# Patient Record
Sex: MALE | Employment: FULL TIME | URBAN - METROPOLITAN AREA
[De-identification: names, ages, dates, MRNs, and addresses within clinical notes are randomized per-mention and may not be internally consistent; named-entity substitution may affect disease eponyms.]

---

## 2022-07-30 DIAGNOSIS — Z00.00 ENCOUNTER FOR PREVENTIVE HEALTH EXAMINATION: ICD-10-CM

## 2022-08-23 ENCOUNTER — OFFICE VISIT (OUTPATIENT)
Dept: FAMILY MEDICINE CLINIC | Facility: CLINIC | Age: 47
End: 2022-08-23

## 2022-08-23 ENCOUNTER — HOSPITAL ENCOUNTER (OUTPATIENT)
Dept: NON INVASIVE DIAGNOSTICS | Facility: CLINIC | Age: 47
Discharge: HOME | End: 2022-08-23

## 2022-08-23 ENCOUNTER — APPOINTMENT (OUTPATIENT)
Dept: LAB | Facility: CLINIC | Age: 47
End: 2022-08-23

## 2022-08-23 ENCOUNTER — HOSPITAL ENCOUNTER (OUTPATIENT)
Dept: VASCULAR ULTRASOUND | Facility: HOSPITAL | Age: 47
Discharge: HOME | End: 2022-08-23

## 2022-08-23 ENCOUNTER — HOSPITAL ENCOUNTER (OUTPATIENT)
Dept: ULTRASOUND IMAGING | Facility: HOSPITAL | Age: 47
Discharge: HOME | End: 2022-08-23

## 2022-08-23 ENCOUNTER — HOSPITAL ENCOUNTER (OUTPATIENT)
Dept: CT IMAGING | Facility: HOSPITAL | Age: 47
Discharge: HOME | End: 2022-08-23

## 2022-08-23 VITALS
RESPIRATION RATE: 14 BRPM | SYSTOLIC BLOOD PRESSURE: 142 MMHG | DIASTOLIC BLOOD PRESSURE: 90 MMHG | BODY MASS INDEX: 36.56 KG/M2 | HEIGHT: 70 IN | HEART RATE: 82 BPM | WEIGHT: 255.4 LBS

## 2022-08-23 VITALS
SYSTOLIC BLOOD PRESSURE: 113 MMHG | WEIGHT: 255.73 LBS | DIASTOLIC BLOOD PRESSURE: 77 MMHG | BODY MASS INDEX: 36.61 KG/M2 | HEIGHT: 70 IN | HEART RATE: 80 BPM

## 2022-08-23 DIAGNOSIS — Z00.00 ENCOUNTER FOR PREVENTIVE HEALTH EXAMINATION: ICD-10-CM

## 2022-08-23 DIAGNOSIS — J30.1 SEASONAL ALLERGIC RHINITIS DUE TO POLLEN: ICD-10-CM

## 2022-08-23 DIAGNOSIS — E78.2 MIXED HYPERLIPIDEMIA: ICD-10-CM

## 2022-08-23 DIAGNOSIS — E66.09 CLASS 2 OBESITY DUE TO EXCESS CALORIES WITHOUT SERIOUS COMORBIDITY WITH BODY MASS INDEX (BMI) OF 36.0 TO 36.9 IN ADULT: ICD-10-CM

## 2022-08-23 DIAGNOSIS — R31.29 MICROSCOPIC HEMATURIA: ICD-10-CM

## 2022-08-23 DIAGNOSIS — Z00.00 WELL ADULT EXAM: Primary | ICD-10-CM

## 2022-08-23 DIAGNOSIS — N40.1 BENIGN PROSTATIC HYPERPLASIA WITH URINARY FREQUENCY: ICD-10-CM

## 2022-08-23 DIAGNOSIS — K58.0 IRRITABLE BOWEL SYNDROME WITH DIARRHEA: ICD-10-CM

## 2022-08-23 DIAGNOSIS — E55.9 VITAMIN D DEFICIENCY: ICD-10-CM

## 2022-08-23 DIAGNOSIS — R73.09 ABNORMAL BLOOD SUGAR: ICD-10-CM

## 2022-08-23 DIAGNOSIS — R91.1 LUNG NODULE: ICD-10-CM

## 2022-08-23 DIAGNOSIS — R35.0 BENIGN PROSTATIC HYPERPLASIA WITH URINARY FREQUENCY: ICD-10-CM

## 2022-08-23 PROBLEM — E66.812 CLASS 2 OBESITY DUE TO EXCESS CALORIES WITHOUT SERIOUS COMORBIDITY WITH BODY MASS INDEX (BMI) OF 36.0 TO 36.9 IN ADULT: Status: ACTIVE | Noted: 2022-08-23

## 2022-08-23 LAB
25(OH)D3 SERPL-MCNC: 19.5 NG/ML (ref 30–100)
ALBUMIN SERPL BCP-MCNC: 4.6 G/DL (ref 3.5–5)
ALP SERPL-CCNC: 36 U/L (ref 34–104)
ALT SERPL W P-5'-P-CCNC: 47 U/L (ref 7–52)
ANION GAP SERPL CALCULATED.3IONS-SCNC: 5 MMOL/L (ref 4–13)
AORTIC ROOT: 3.1 CM
APICAL FOUR CHAMBER EJECTION FRACTION: 55 %
ASCENDING AORTA: 2.8 CM
AST SERPL W P-5'-P-CCNC: 27 U/L (ref 13–39)
ATRIAL RATE: 69 BPM
BACTERIA UR QL AUTO: ABNORMAL /HPF
BASELINE ST DEPRESSION: 0 MM
BASOPHILS # BLD AUTO: 0.02 THOUSANDS/ΜL (ref 0–0.1)
BASOPHILS NFR BLD AUTO: 1 % (ref 0–1)
BILIRUB SERPL-MCNC: 1.19 MG/DL (ref 0.2–1)
BILIRUB UR QL STRIP: NEGATIVE
BUN SERPL-MCNC: 13 MG/DL (ref 5–25)
CALCIUM SERPL-MCNC: 9.5 MG/DL (ref 8.4–10.2)
CHEST PAIN STATEMENT: NORMAL
CHLORIDE SERPL-SCNC: 101 MMOL/L (ref 96–108)
CHOLEST SERPL-MCNC: 252 MG/DL
CLARITY UR: CLEAR
CO2 SERPL-SCNC: 31 MMOL/L (ref 21–32)
COLOR UR: ABNORMAL
CREAT SERPL-MCNC: 1.1 MG/DL (ref 0.6–1.3)
CRP SERPL HS-MCNC: <0.9 MG/L
E WAVE DECELERATION TIME: 240 MS
EOSINOPHIL # BLD AUTO: 0.11 THOUSAND/ΜL (ref 0–0.61)
EOSINOPHIL NFR BLD AUTO: 3 % (ref 0–6)
ERYTHROCYTE [DISTWIDTH] IN BLOOD BY AUTOMATED COUNT: 14.3 % (ref 11.6–15.1)
EST. AVERAGE GLUCOSE BLD GHB EST-MCNC: 131 MG/DL
FRACTIONAL SHORTENING: 31 % (ref 28–44)
GFR SERPL CREATININE-BSD FRML MDRD: 81 ML/MIN/1.73SQ M
GLUCOSE P FAST SERPL-MCNC: 102 MG/DL (ref 65–99)
GLUCOSE UR STRIP-MCNC: NEGATIVE MG/DL
HBA1C MFR BLD: 6.2 %
HCT VFR BLD AUTO: 44.7 % (ref 36.5–49.3)
HCV AB SER QL: NORMAL
HDLC SERPL-MCNC: 52 MG/DL
HGB BLD-MCNC: 14.9 G/DL (ref 12–17)
HGB UR QL STRIP.AUTO: NEGATIVE
IMM GRANULOCYTES # BLD AUTO: 0.01 THOUSAND/UL (ref 0–0.2)
IMM GRANULOCYTES NFR BLD AUTO: 0 % (ref 0–2)
INTERVENTRICULAR SEPTUM IN DIASTOLE (PARASTERNAL SHORT AXIS VIEW): 1 CM
INTERVENTRICULAR SEPTUM: 1 CM (ref 0.6–1.1)
KETONES UR STRIP-MCNC: NEGATIVE MG/DL
LAAS-AP2: 20.2 CM2
LAAS-AP4: 16.4 CM2
LDLC SERPL CALC-MCNC: 183 MG/DL (ref 0–100)
LEFT ATRIUM AREA SYSTOLE SINGLE PLANE A4C: 18.4 CM2
LEFT ATRIUM SIZE: 3.9 CM
LEFT INTERNAL DIMENSION IN SYSTOLE: 3.4 CM (ref 2.1–4)
LEFT VENTRICULAR INTERNAL DIMENSION IN DIASTOLE: 4.9 CM (ref 3.5–6)
LEFT VENTRICULAR POSTERIOR WALL IN END DIASTOLE: 1 CM
LEFT VENTRICULAR STROKE VOLUME: 63 ML
LEUKOCYTE ESTERASE UR QL STRIP: NEGATIVE
LVSV (TEICH): 63 ML
LYMPHOCYTES # BLD AUTO: 1.91 THOUSANDS/ΜL (ref 0.6–4.47)
LYMPHOCYTES NFR BLD AUTO: 43 % (ref 14–44)
MAX DIASTOLIC BP: 78 MMHG
MAX HEART RATE: 166 BPM
MAX HR PERCENT: 94 %
MAX HR: 166 BPM
MAX PREDICTED HEART RATE: 176 BPM
MAX. SYSTOLIC BP: 204 MMHG
MCH RBC QN AUTO: 29.7 PG (ref 26.8–34.3)
MCHC RBC AUTO-ENTMCNC: 33.3 G/DL (ref 31.4–37.4)
MCV RBC AUTO: 89 FL (ref 82–98)
MONOCYTES # BLD AUTO: 0.41 THOUSAND/ΜL (ref 0.17–1.22)
MONOCYTES NFR BLD AUTO: 10 % (ref 4–12)
MV E'TISSUE VEL-SEP: 8 CM/S
MV PEAK A VEL: 0.67 M/S
MV PEAK E VEL: 59 CM/S
MV STENOSIS PRESSURE HALF TIME: 70 MS
MV VALVE AREA P 1/2 METHOD: 3.14 CM2
NEUTROPHILS # BLD AUTO: 1.86 THOUSANDS/ΜL (ref 1.85–7.62)
NEUTS SEG NFR BLD AUTO: 43 % (ref 43–75)
NITRITE UR QL STRIP: NEGATIVE
NON-SQ EPI CELLS URNS QL MICRO: ABNORMAL /HPF
NRBC BLD AUTO-RTO: 0 /100 WBCS
P AXIS: 56 DEGREES
PH UR STRIP.AUTO: 7 [PH]
PLATELET # BLD AUTO: 272 THOUSANDS/UL (ref 149–390)
PMV BLD AUTO: 9.7 FL (ref 8.9–12.7)
POST LVEF: 70 %
POTASSIUM SERPL-SCNC: 4.2 MMOL/L (ref 3.5–5.3)
PR INTERVAL: 176 MS
PROT SERPL-MCNC: 7.6 G/DL (ref 6.4–8.4)
PROT UR STRIP-MCNC: NEGATIVE MG/DL
PROTOCOL NAME: NORMAL
PSA SERPL-MCNC: 1 NG/ML (ref 0–4)
QRS AXIS: 67 DEGREES
QRSD INTERVAL: 98 MS
QT INTERVAL: 422 MS
QTC INTERVAL: 452 MS
RATE PRESSURE PRODUCT: NORMAL
RBC # BLD AUTO: 5.01 MILLION/UL (ref 3.88–5.62)
RBC #/AREA URNS AUTO: ABNORMAL /HPF
REASON FOR TERMINATION: NORMAL
RIGHT ATRIUM AREA SYSTOLE A4C: 16.5 CM2
RIGHT VENTRICLE ID DIMENSION: 4.3 CM
SL CV LEFT ATRIUM LENGTH A2C: 5 CM
SL CV LV EF: 60
SL CV LV EF: 60
SL CV PED ECHO LEFT VENTRICLE DIASTOLIC VOLUME (MOD BIPLANE) 2D: 111 ML
SL CV PED ECHO LEFT VENTRICLE SYSTOLIC VOLUME (MOD BIPLANE) 2D: 48 ML
SL CV STRESS RECOVERY BP: NORMAL MMHG
SL CV STRESS RECOVERY HR: 97 BPM
SL CV STRESS RECOVERY O2 SAT: 100 %
SODIUM SERPL-SCNC: 137 MMOL/L (ref 135–147)
SP GR UR STRIP.AUTO: 1.01 (ref 1–1.03)
STRESS ANGINA INDEX: 0
STRESS BASELINE BP: NORMAL MMHG
STRESS BASELINE HR: 66 BPM
STRESS DUKE TREADMILL SCORE: 13
STRESS O2 SAT REST: 100 %
STRESS PEAK HR: 166 BPM
STRESS POST ESTIMATED WORKLOAD: 15.3 METS
STRESS POST EXERCISE DUR MIN: 13 MIN
STRESS POST O2 SAT PEAK: 97 %
STRESS POST PEAK BP: 168 MMHG
STRESS ST DEPRESSION: 0 MM
T WAVE AXIS: -17 DEGREES
TARGET HR FORMULA: NORMAL
TEST INDICATION: NORMAL
TIME IN EXERCISE PHASE: NORMAL
TRIGL SERPL-MCNC: 84 MG/DL
TSH SERPL DL<=0.05 MIU/L-ACNC: 1.62 UIU/ML (ref 0.45–4.5)
UROBILINOGEN UR STRIP-ACNC: <2 MG/DL
VENTRICULAR RATE: 69 BPM
WBC # BLD AUTO: 4.32 THOUSAND/UL (ref 4.31–10.16)
WBC #/AREA URNS AUTO: ABNORMAL /HPF

## 2022-08-23 PROCEDURE — 86141 C-REACTIVE PROTEIN HS: CPT

## 2022-08-23 PROCEDURE — 86803 HEPATITIS C AB TEST: CPT

## 2022-08-23 PROCEDURE — G1004 CDSM NDSC: HCPCS

## 2022-08-23 PROCEDURE — 75571 CT HRT W/O DYE W/CA TEST: CPT

## 2022-08-23 PROCEDURE — 93005 ELECTROCARDIOGRAM TRACING: CPT

## 2022-08-23 PROCEDURE — VASC: Performed by: SURGERY

## 2022-08-23 PROCEDURE — 93010 ELECTROCARDIOGRAM REPORT: CPT | Performed by: INTERNAL MEDICINE

## 2022-08-23 PROCEDURE — 93306 TTE W/DOPPLER COMPLETE: CPT | Performed by: INTERNAL MEDICINE

## 2022-08-23 PROCEDURE — 85025 COMPLETE CBC W/AUTO DIFF WBC: CPT

## 2022-08-23 PROCEDURE — 83036 HEMOGLOBIN GLYCOSYLATED A1C: CPT

## 2022-08-23 PROCEDURE — 76700 US EXAM ABDOM COMPLETE: CPT

## 2022-08-23 PROCEDURE — 99499EX: Performed by: FAMILY MEDICINE

## 2022-08-23 PROCEDURE — 93306 TTE W/DOPPLER COMPLETE: CPT

## 2022-08-23 PROCEDURE — 84153 ASSAY OF PSA TOTAL: CPT

## 2022-08-23 PROCEDURE — 93351 STRESS TTE COMPLETE: CPT | Performed by: INTERNAL MEDICINE

## 2022-08-23 PROCEDURE — 84443 ASSAY THYROID STIM HORMONE: CPT

## 2022-08-23 PROCEDURE — 93922 UPR/L XTREMITY ART 2 LEVELS: CPT

## 2022-08-23 PROCEDURE — 82306 VITAMIN D 25 HYDROXY: CPT

## 2022-08-23 PROCEDURE — 93350 STRESS TTE ONLY: CPT

## 2022-08-23 PROCEDURE — 81001 URINALYSIS AUTO W/SCOPE: CPT

## 2022-08-23 PROCEDURE — 36415 COLL VENOUS BLD VENIPUNCTURE: CPT

## 2022-08-23 PROCEDURE — 80053 COMPREHEN METABOLIC PANEL: CPT

## 2022-08-23 PROCEDURE — 80061 LIPID PANEL: CPT

## 2022-08-23 NOTE — PROGRESS NOTES
Hearing Assessment Summary:     Hearing Screening    125Hz 250Hz 500Hz 1000Hz 2000Hz 3000Hz 4000Hz 6000Hz 8000Hz   Right ear:  15 10 10 10 15 20 10 15   Left ear:  15 5 10 10 20 10 5 0   Comments: HEARING EVALUATION    Name:  Mei Simmons  :  1975  Age:  42 y o  Date of Evaluation: 22     History: [facility] Exam  Reason for visit: Mei Simmons is being seen today for an evaluation of hearing as part of an [facility] examination  Caregiver reports some difficulty understanding in complex environments  He denies ear pain, tinnitus and dizziness  EVALUATION:    Otoscopic Evaluation:   Right Ear: Clear and healthy ear canal and tympanic membrane   Left Ear: Clear and healthy ear canal and tympanic membrane    Tympanometry:   Right: Type A - normal middle ear pressure and compliance   Left: Type A - normal middle ear pressure and compliance    Audiogram Results:  Normal peripheral hearing sensitivity in each ear  Excellent speech discrimination ability under quiet conditions  *see attached audiogram      RECOMMENDATIONS:  Return to [facility]  for F/U, Copy to Patient/Caregiver and hearing evaluation in 2 years (sooner if difficulty noted)  PATIENT EDUCATION:   Discussed results and recommendations with patient  Questions were addressed and the patient was encouraged to contact our department should concerns arise        Kath Sheth   Clinical Audiologist'     Visual Acuity Screening    Right eye Left eye Both eyes   Without correction:      With correction: 20/13 20/13 20/13

## 2022-08-23 NOTE — ASSESSMENT & PLAN NOTE
Your CT scan showed a small lung nodule (3 mm)  This is most likely benign, but out of an abundance of caution, I would like to repeat your CT chest in 1 year to ensure stability  I will place this order for you

## 2022-08-23 NOTE — ASSESSMENT & PLAN NOTE
You had a few red blood cells present in your urinalysis today  This is most likely insignificant, but I would recommend repeating a urinalysis in the near future  I will place an order for you to get this done (or if you would prefer, you can have your PCP order this)

## 2022-08-23 NOTE — ASSESSMENT & PLAN NOTE
It appears that you are doing well with over-the-counter use of antihistamines for your seasonal allergies  I think it is reasonable to continue this treatment

## 2022-08-23 NOTE — ASSESSMENT & PLAN NOTE
Since you will be 47 next week, I would recommend getting a baseline colonoscopy  For convenience, you will most likely be scheduling this by your home in [place], but if you would prefer getting this done in the [facility], I could help to coordinate for you  I am glad you had your COVID vaccination, along with booster  You may be due for a tetanus booster  If you think it has been greater than 10 years, then I would get this done (you could either have done a your PCP office, or go to a local pharmacy)  Lastly, I would recommend a healthy diet, and regular exercise program (both aerobic and resistance training) on a regular, if not daily basis

## 2022-08-23 NOTE — PROGRESS NOTES
Fitness Summary and Recommendations:  Effie Breen scored at the 5% on his body composition assessment with a bodyfat % of 38 9%  Effie Breen scored in the average range for flexibility with a sit & reach score of 22 cm  His Muscle Strength/Endurance scores placed him at the 50% (lower body) and 95% (upper body) with a chair stand score of 27 and arm curl score of 39 respectively  [facility] Cardiovascular Score of 65 1 placed him at the 95%  Overall, Effie Breen would be considered to have an average/above average fitness level with a 59% score  Continued emphasis on regular exercise and sound nutrition practices will enable Effie Breen to reach his optimal level of fitness

## 2022-08-23 NOTE — PROGRESS NOTES
[facility] Physical Exam     Ian Negrete is a 42 y o  male who is presenting for his 1st [facility] Physical Exam at [address]  Mr Carmelo Milian is the  of [employer] at [address]  He and his family reside in [place]  A considerable portion of his work is remote, but he does travel quite a bit, especially to offices in [place] and [place]  His past medical history  includes seasonal allergic rhinitis  He takes  p r n  over-the-counter antihistamines for this  (He rotates through Claritin, Allegra, or Zyrtec, Allegra, or Zyrtec)  His past surgical history includes vasectomy in 2013  He never smoked cigarettes,  but does smoke an occasional cigar  (a few per month)  He drinks he drinks approximately 1 alcoholic beverage per day  He drinks  2 caffeinated beverages per day  He is  with 2 children, ages ages 5 and 15  He states that his diet is somewhat healthy,  but with plenty of room for improvement  He exercises  a few days a week, mostly on the weekends (riding either an exercise bike or mountain bike outside)  His concerns today include stress at work,  weight loss, looses stools, and daytime urinary frequency  Review of Systems   Constitutional: Negative for chills and fever  HENT: Negative for ear pain and sore throat  Eyes: Negative for pain and visual disturbance  Respiratory: Negative for cough and shortness of breath  Cardiovascular: Negative for chest pain and palpitations  Gastrointestinal: Positive for diarrhea  Negative for abdominal pain, blood in stool and vomiting  Genitourinary: Positive for frequency  Negative for dysuria and hematuria  Musculoskeletal: Negative for arthralgias and back pain  Skin: Negative for color change and rash  Neurological: Negative for seizures and syncope  All other systems reviewed and are negative          Active Ambulatory Problems     Diagnosis Date Noted    Seasonal allergic rhinitis due to pollen 08/23/2022    Well adult exam 08/23/2022    Class 2 obesity due to excess calories without serious comorbidity with body mass index (BMI) of 36 0 to 36 9 in adult 08/23/2022    Irritable bowel syndrome with diarrhea 08/23/2022    Benign prostatic hyperplasia with urinary frequency 08/23/2022    Vitamin D deficiency 08/23/2022    Mixed hyperlipidemia 08/23/2022    Abnormal blood sugar 08/23/2022    Microscopic hematuria 08/23/2022    Lung nodule 08/23/2022     Resolved Ambulatory Problems     Diagnosis Date Noted    No Resolved Ambulatory Problems     Past Medical History:   Diagnosis Date    Allergic        Past Surgical History:   Procedure Laterality Date    VASECTOMY  2013       Family History   Problem Relation Age of Onset    No Known Problems Mother     No Known Problems Father        Social History     Tobacco Use   Smoking Status Current Some Day Smoker    Types: Cigars   Smokeless Tobacco Never Used       No current outpatient medications on file  No Known Allergies      Objective:    Vitals:    08/23/22 1058   BP: 142/90   Pulse: 82   Resp: 14   Weight: 116 kg (255 lb 6 4 oz)   Height: 5' 10" (1 778 m)        Physical Exam  Constitutional:       Appearance: Normal appearance  HENT:      Head: Normocephalic and atraumatic  Right Ear: Tympanic membrane, ear canal and external ear normal  There is no impacted cerumen  Left Ear: Tympanic membrane, ear canal and external ear normal  There is no impacted cerumen  Nose: Nose normal       Mouth/Throat:      Mouth: Mucous membranes are moist       Pharynx: Oropharynx is clear  No posterior oropharyngeal erythema  Eyes:      Extraocular Movements: Extraocular movements intact  Conjunctiva/sclera: Conjunctivae normal       Pupils: Pupils are equal, round, and reactive to light  Neck:      Vascular: No carotid bruit     Cardiovascular:      Rate and Rhythm: Normal rate and regular rhythm  Pulses: Normal pulses  Heart sounds: Normal heart sounds  No murmur heard  Pulmonary:      Effort: Pulmonary effort is normal       Breath sounds: Normal breath sounds  Abdominal:      General: Abdomen is flat  Bowel sounds are normal  There is no distension  Palpations: Abdomen is soft  There is no mass  Tenderness: There is no abdominal tenderness  Hernia: No hernia is present  Genitourinary:     Prostate: Normal       Rectum: Guaiac result negative  Comments: JEYSON: prostate 1 5x size  Heme-negative stool  No asymmetry present  No nodules  Musculoskeletal:         General: Normal range of motion  Cervical back: Normal range of motion and neck supple  Lymphadenopathy:      Cervical: No cervical adenopathy  Skin:     General: Skin is warm  Capillary Refill: Capillary refill takes less than 2 seconds  Coloration: Skin is not jaundiced  Findings: No rash  Neurological:      General: No focal deficit present  Mental Status: He is alert and oriented to person, place, and time  Cranial Nerves: No cranial nerve deficit  Motor: No weakness  Gait: Gait normal    Psychiatric:         Mood and Affect: Mood normal          Behavior: Behavior normal          Thought Content: Thought content normal          Judgment: Judgment normal              Assessment/Plan:     Here are the findings from today's exam:(also see cardiology and dermatology reports)        1  Well adult exam  Assessment & Plan:      Since you will be 47 next week, I would recommend getting a baseline colonoscopy  For convenience, you will most likely be scheduling this by your home in [place], but if you would prefer getting this done in the [facility], I could help to coordinate for you  I am glad you had your COVID vaccination, along with booster  You may be due for a tetanus booster    If you think it has been greater than 10 years, then I would get this done (you could either have done a your PCP office, or go to a local pharmacy)  Lastly, I would recommend a healthy diet, and regular exercise program (both aerobic and resistance training) on a regular, if not daily basis  2  Class 2 obesity due to excess calories without serious comorbidity with body mass index (BMI) of 36 0 to 36 9 in adult  Assessment & Plan: Your current weight is 255 lb (BMI of 36 65)  Taking your body type into consideration, I think a weight loss of 35 lb would provide considerable results in our overall health status  Please follow the recommendations given to you today by our dietitian and exercise physiologist       3  Benign prostatic hyperplasia with urinary frequency  Assessment & Plan:  You have been complaining of ongoing daytime symptoms of urinary frequency and hesitancy  Your prostate exam today is consistent with BPH  Fortunately your blood PSA level was normal   BPH (benign prostatic hypertrophy) is very common in men over the age of 36 or so  You may benefit from a medication  I would consider discussing this further with your PCP (or see a urologist)  4  Mixed hyperlipidemia  -     Lipid Panel with Direct LDL reflex; Future; Expected date: 11/01/2022    5  Abnormal blood sugar  Assessment & Plan:  Blood sugar is mildly your blood sugar today was elevated at 102  Your long-term blood sugar (hemoglobin A1c) was also high at 6 2%  This puts you in "prediabetes" category  I would recommend reducing carbohydrates in your diet (sweets, rice, potatoes, pasta)  Also recommend increasing exercise  I would recommend repeating your labs again in approximately 3 months  I will place lab orders for you to get done (or you can have this done 3 your PCP in [place])  Orders:  -     Comprehensive metabolic panel; Future; Expected date: 11/01/2022  -     Hemoglobin A1C; Future; Expected date: 11/01/2022    6   Vitamin D deficiency  Assessment & Plan:  Vitamin-D level is low at 19 2 (normal is greater than 30)  Vitamin-D is essential in order to absorb calcium into your bones  I would recommend starting an OTC vitamin D3 supplement at 5000 International Units daily  I would recommend repeating your vitamin-D level again in 3 months (I will place this lab order for you, but you may also have it ordered by your PCP in [place])  Orders:  -     Vitamin D 25 hydroxy; Future; Expected date: 11/01/2022    7  Irritable bowel syndrome with diarrhea  Assessment & Plan:  You have been complaining of ongoing problems with loose stools  Fortunately, you have not had any associated rectal bleeding  I would recommend getting a baseline colonoscopy in the near future to rule out other conditions, but your symptoms seem to indicate irritable bowel disease  This is most likely linked to your diet, but stress can also play a role  8  Microscopic hematuria  Assessment & Plan:  You had a few red blood cells present in your urinalysis today  This is most likely insignificant, but I would recommend repeating a urinalysis in the near future  I will place an order for you to get this done (or if you would prefer, you can have your PCP order this)  Orders:  -     UA (URINE) with reflex to Scope; Future; Expected date: 11/01/2022    9  Seasonal allergic rhinitis due to pollen  Assessment & Plan:  It appears that you are doing well with over-the-counter use of antihistamines for your seasonal allergies  I think it is reasonable to continue this treatment  10  Lung nodule  Assessment & Plan: Your CT scan showed a small lung nodule (3 mm)  This is most likely benign, but out of an abundance of caution, I would like to repeat your CT chest in 1 year to ensure stability  I will place this order for you  Orders:  -     CT chest wo contrast; Future; Expected date: 08/01/2023      Heide Hook,    Thank you for choosing [address] [place] [address] Dr Johns    It was a pleasure meeting and getting to know you today  If you have any questions regarding today's exam, feel free to contact me  We hope to see you again in the future  Uday Guerra ([id number]  Physician)  [phone] (cell)  [email or web address]

## 2022-08-23 NOTE — PROGRESS NOTES
Heart and Vascular Summary:     Baseline EKG:   Normal sinus rhythm, possible left atrial enlargement, incomplete right bundle branch block, abnormal ECG  There is mild slowing of conduction through the right heart, which could be due to higher blood pressures  Echocardiogram: Normal right and left ventricular size and function  Left ventricular ejection fraction (EF) was 60%  No significant valvular abnormalities  This was a normal echocardiogram             Stress Echocardiogram: Excellent exercise capacity (13 mins), achieving 15 3 METS, and 94% of maximal predicted heart rate  You had no significant EKG changes during stress that suggest any ischemia or blockages over 50%  Blood pressure was high normal at the start of the test, with a hypertensive response to exercise (peak blood pressure of 235 systolic)  You had a good heart rate and blood pressure recovery after exercise  Based on the Duke Treadmill score, there is a low risk for cardiac events  Normal baseline left ventricular wall motion and function on echocardiogram, with no significant wall motion abnormalities or reduction in function with peak exercise  This confirms that there are no functionally significant blockages over 50% in the coronary arteries at this time  Lipid Profile: this revealed total cholesterol of 252, and LDL of 183 HDL of 52 and Triglyceride level of 84  The total cholesterol is a sum of its individual parts  The HDL is the good cholesterol, which is protective to your heart  Your level of 52 is very good  The Triglycerides are a marker of your diet and genetics  Less than 150 is what we aim for, and your level is controlled  The LDL is the bad cholesterol, the cholesterol that builds atherosclerotic plaque in your arteries  The level of 183 is elevated  A heart healthy diet and exercise program is indicated to help reduce your LDL level              Coronary calcium score: your coronary calcium score was 0, placing you at the 0th percentile for age/gender matched individuals  This does not rule out the possibility of soft plaque  ASCVD Risk : your 10-year cardiovascular risk for an event is 3 3%, which is low, with optimal of 2 8% in age and gender matched individuals  Continue active healthy lifestyle and management of cholesterol to maintain cardiovascular health

## 2022-08-23 NOTE — ASSESSMENT & PLAN NOTE
Vitamin-D level is low at 19 2 (normal is greater than 30)  Vitamin-D is essential in order to absorb calcium into your bones  I would recommend starting an OTC vitamin D3 supplement at 5000 International Units daily  I would recommend repeating your vitamin-D level again in 3 months (I will place this lab order for you, but you may also have it ordered by your PCP in [place])

## 2022-08-23 NOTE — ASSESSMENT & PLAN NOTE
Blood sugar is mildly your blood sugar today was elevated at 102  Your long-term blood sugar (hemoglobin A1c) was also high at 6 2%  This puts you in "prediabetes" category  I would recommend reducing carbohydrates in your diet (sweets, rice, potatoes, pasta)  Also recommend increasing exercise  I would recommend repeating your labs again in approximately 3 months  I will place lab orders for you to get done (or you can have this done 3 your PCP in [place])

## 2022-08-23 NOTE — ASSESSMENT & PLAN NOTE
You have been complaining of ongoing problems with loose stools  Fortunately, you have not had any associated rectal bleeding  I would recommend getting a baseline colonoscopy in the near future to rule out other conditions, but your symptoms seem to indicate irritable bowel disease  This is most likely linked to your diet, but stress can also play a role

## 2022-08-23 NOTE — PROGRESS NOTES
Nutritional Summary and Recommendations:     Patient Nutrition-Oriented Medical/Diet Hx  Suzanna Sahni presents today to [email or web address] for nutrition assessment and counseling  Dietary goals include to increase vegetable intake, reduce processed foods, and consume smaller portions  Discussion focused on Myplate, reading food labels, and planning well-balanced meals and snacks  Labs reviewed  Discussion focused on ways to reduce A1C and cholesterol levels  Current Outpatient Medications:  Zyrtec  MVI     Past Medical History:  Past Medical History:   Diagnosis Date    Allergic         Labs:  A1C 6 2  Cholesterol 252  Triglycerides 84  HDL 52    Vitamin D 19 5     Anthropometrics:     Ht: 5'10"     Wt: 255 lb     BMI: 36 6     UBW: 250 lb     DBW: 220 lb     BMR:  2254 kcals     Fat%: 38 9%     Fat Mass: 99 2 lb     FFM: 155 8 lb     TBW: 114 lb      Nutrition Diagnosis:  Altered nutrition related labs r/t physiological issues as evidenced by A1C 6 2%         Nutrition Interventions/Recommendations:    1  Eat breakfast daily such as oatmeal with fruit or eggs with whole grain bread and vegetables  2  Read food labels for appropriate portion sizes of snack foods and carbohydrates  Use MyPlate to plan appropriate meals  3  Achieve reduction in A1C levels within 3-6 months through dietary changes as discussed with RD   4  Contact RD with any questions or concerns           Perceived Comprehension:   Good   Expected Compliance: Good

## 2022-08-23 NOTE — ASSESSMENT & PLAN NOTE
You have been complaining of ongoing daytime symptoms of urinary frequency and hesitancy  Your prostate exam today is consistent with BPH  Fortunately your blood PSA level was normal   BPH (benign prostatic hypertrophy) is very common in men over the age of 36 or so  You may benefit from a medication  I would consider discussing this further with your PCP (or see a urologist)

## 2022-08-29 DIAGNOSIS — R31.29 MICROSCOPIC HEMATURIA: Primary | ICD-10-CM

## 2022-08-29 NOTE — PROGRESS NOTES
ADDENDUM  [address] PHYSICAL AUGUST 23, 2022; I spoke with radiologist (Dr Nyasia Lin)  After re-reviewing patient's recent ultrasound results, he saw a possible contour prominence of left kidney  Even though this finding is most likely benign, in combination with painless hematuria, would recommend renal ultrasound  I spoke with patient  Ultrasound ordered  I will contact patient with results

## 2022-10-22 PROBLEM — Z00.00 WELL ADULT EXAM: Status: RESOLVED | Noted: 2022-08-23 | Resolved: 2022-10-22

## 2024-11-19 DIAGNOSIS — Z00.00 ENCOUNTER FOR PREVENTIVE HEALTH EXAMINATION: Primary | ICD-10-CM

## 2024-12-03 ENCOUNTER — LAB (OUTPATIENT)
Dept: LAB | Facility: CLINIC | Age: 49
End: 2024-12-03

## 2024-12-03 ENCOUNTER — HOSPITAL ENCOUNTER (OUTPATIENT)
Dept: VASCULAR ULTRASOUND | Facility: HOSPITAL | Age: 49
Discharge: HOME | End: 2024-12-03

## 2024-12-03 ENCOUNTER — HOSPITAL ENCOUNTER (OUTPATIENT)
Dept: NON INVASIVE DIAGNOSTICS | Facility: CLINIC | Age: 49
Discharge: HOME | End: 2024-12-03

## 2024-12-03 ENCOUNTER — HOSPITAL ENCOUNTER (OUTPATIENT)
Dept: ULTRASOUND IMAGING | Facility: HOSPITAL | Age: 49
Discharge: HOME | End: 2024-12-03

## 2024-12-03 ENCOUNTER — OFFICE VISIT (OUTPATIENT)
Dept: FAMILY MEDICINE CLINIC | Facility: CLINIC | Age: 49
End: 2024-12-03

## 2024-12-03 VITALS
WEIGHT: 255.73 LBS | DIASTOLIC BLOOD PRESSURE: 76 MMHG | HEART RATE: 68 BPM | SYSTOLIC BLOOD PRESSURE: 132 MMHG | HEIGHT: 70 IN | BODY MASS INDEX: 36.61 KG/M2

## 2024-12-03 VITALS
SYSTOLIC BLOOD PRESSURE: 132 MMHG | DIASTOLIC BLOOD PRESSURE: 76 MMHG | BODY MASS INDEX: 36.65 KG/M2 | HEIGHT: 70 IN | OXYGEN SATURATION: 100 % | WEIGHT: 256 LBS | RESPIRATION RATE: 14 BRPM | HEART RATE: 69 BPM

## 2024-12-03 DIAGNOSIS — Z00.00 ENCOUNTER FOR PREVENTIVE HEALTH EXAMINATION: ICD-10-CM

## 2024-12-03 DIAGNOSIS — R74.01 ELEVATED ALT MEASUREMENT: ICD-10-CM

## 2024-12-03 DIAGNOSIS — R91.1 LUNG NODULE: ICD-10-CM

## 2024-12-03 DIAGNOSIS — R73.09 ABNORMAL BLOOD SUGAR: ICD-10-CM

## 2024-12-03 DIAGNOSIS — E55.9 VITAMIN D DEFICIENCY: ICD-10-CM

## 2024-12-03 DIAGNOSIS — E78.2 MIXED HYPERLIPIDEMIA: ICD-10-CM

## 2024-12-03 DIAGNOSIS — I72.3 ILIAC ANEURYSM (HCC): ICD-10-CM

## 2024-12-03 DIAGNOSIS — E66.812 CLASS 2 OBESITY DUE TO EXCESS CALORIES WITHOUT SERIOUS COMORBIDITY WITH BODY MASS INDEX (BMI) OF 36.0 TO 36.9 IN ADULT: Primary | ICD-10-CM

## 2024-12-03 DIAGNOSIS — E66.09 CLASS 2 OBESITY DUE TO EXCESS CALORIES WITHOUT SERIOUS COMORBIDITY WITH BODY MASS INDEX (BMI) OF 36.0 TO 36.9 IN ADULT: Primary | ICD-10-CM

## 2024-12-03 PROBLEM — K76.0 NAFLD (NONALCOHOLIC FATTY LIVER DISEASE): Status: ACTIVE | Noted: 2024-12-03

## 2024-12-03 PROBLEM — K76.0 NAFLD (NONALCOHOLIC FATTY LIVER DISEASE): Status: RESOLVED | Noted: 2024-12-03 | Resolved: 2024-12-03

## 2024-12-03 LAB
ALBUMIN SERPL BCG-MCNC: 4.6 G/DL (ref 3.5–5)
ALP SERPL-CCNC: 45 U/L (ref 34–104)
ALT SERPL W P-5'-P-CCNC: 85 U/L (ref 7–52)
ANION GAP SERPL CALCULATED.3IONS-SCNC: 5 MMOL/L (ref 4–13)
AORTIC ROOT: 3.4 CM
APICAL FOUR CHAMBER EJECTION FRACTION: 71 %
ASCENDING AORTA: 3 CM
AST SERPL W P-5'-P-CCNC: 33 U/L (ref 13–39)
ATRIAL RATE: 68 BPM
BACTERIA UR QL AUTO: NORMAL /HPF
BASOPHILS # BLD AUTO: 0.02 THOUSANDS/ΜL (ref 0–0.1)
BASOPHILS NFR BLD AUTO: 0 % (ref 0–1)
BILIRUB SERPL-MCNC: 0.94 MG/DL (ref 0.2–1)
BILIRUB UR QL STRIP: NEGATIVE
BSA FOR ECHO PROCEDURE: 2.32 M2
BUN SERPL-MCNC: 11 MG/DL (ref 5–25)
CALCIUM SERPL-MCNC: 9.2 MG/DL (ref 8.4–10.2)
CHEST PAIN STATEMENT: NORMAL
CHLORIDE SERPL-SCNC: 101 MMOL/L (ref 96–108)
CHOLEST SERPL-MCNC: 239 MG/DL (ref ?–200)
CLARITY UR: CLEAR
CO2 SERPL-SCNC: 33 MMOL/L (ref 21–32)
COLOR UR: NORMAL
CREAT SERPL-MCNC: 1.25 MG/DL (ref 0.6–1.3)
CRP SERPL HS-MCNC: 0.92 MG/L
E WAVE DECELERATION TIME: 190 MS
E/A RATIO: 0.88
EOSINOPHIL # BLD AUTO: 0.24 THOUSAND/ΜL (ref 0–0.61)
EOSINOPHIL NFR BLD AUTO: 5 % (ref 0–6)
ERYTHROCYTE [DISTWIDTH] IN BLOOD BY AUTOMATED COUNT: 15.1 % (ref 11.6–15.1)
EST. AVERAGE GLUCOSE BLD GHB EST-MCNC: 128 MG/DL
FRACTIONAL SHORTENING: 36 (ref 28–44)
GFR SERPL CREATININE-BSD FRML MDRD: 68 ML/MIN/1.73SQ M
GLUCOSE P FAST SERPL-MCNC: 112 MG/DL (ref 65–99)
GLUCOSE UR STRIP-MCNC: NEGATIVE MG/DL
HBA1C MFR BLD: 6.1 %
HCT VFR BLD AUTO: 43.7 % (ref 36.5–49.3)
HDLC SERPL-MCNC: 56 MG/DL
HGB BLD-MCNC: 14.4 G/DL (ref 12–17)
HGB UR QL STRIP.AUTO: NEGATIVE
IMM GRANULOCYTES # BLD AUTO: 0.01 THOUSAND/UL (ref 0–0.2)
IMM GRANULOCYTES NFR BLD AUTO: 0 % (ref 0–2)
INTERVENTRICULAR SEPTUM IN DIASTOLE (PARASTERNAL SHORT AXIS VIEW): 1.2 CM
INTERVENTRICULAR SEPTUM: 1.2 CM (ref 0.6–1.1)
KETONES UR STRIP-MCNC: NEGATIVE MG/DL
LAAS-AP2: 23.3 CM2
LAAS-AP4: 22.3 CM2
LDLC SERPL CALC-MCNC: 167 MG/DL (ref 0–100)
LEFT ATRIUM AREA SYSTOLE SINGLE PLANE A4C: 21.5 CM2
LEFT ATRIUM SIZE: 4.5 CM
LEFT ATRIUM VOLUME (MOD BIPLANE): 77 ML
LEFT ATRIUM VOLUME INDEX (MOD BIPLANE): 33.2 ML/M2
LEFT INTERNAL DIMENSION IN SYSTOLE: 3 CM (ref 2.1–4)
LEFT VENTRICLE DIASTOLIC VOLUME (MOD BIPLANE): 112 ML
LEFT VENTRICLE DIASTOLIC VOLUME INDEX (MOD BIPLANE): 48.3 ML/M2
LEFT VENTRICLE SYSTOLIC VOLUME (MOD BIPLANE): 42 ML
LEFT VENTRICLE SYSTOLIC VOLUME INDEX (MOD BIPLANE): 18.1 ML/M2
LEFT VENTRICULAR INTERNAL DIMENSION IN DIASTOLE: 4.7 CM (ref 3.5–6)
LEFT VENTRICULAR POSTERIOR WALL IN END DIASTOLE: 1.1 CM
LEFT VENTRICULAR STROKE VOLUME: 71 ML
LEUKOCYTE ESTERASE UR QL STRIP: NEGATIVE
LV EF: 63 %
LVSV (TEICH): 71 ML
LYMPHOCYTES # BLD AUTO: 2.07 THOUSANDS/ΜL (ref 0.6–4.47)
LYMPHOCYTES NFR BLD AUTO: 43 % (ref 14–44)
MAX DIASTOLIC BP: 64 MMHG
MAX HR PERCENT: 86 %
MAX HR: 150 BPM
MAX PREDICTED HEART RATE: 173 BPM
MCH RBC QN AUTO: 29.3 PG (ref 26.8–34.3)
MCHC RBC AUTO-ENTMCNC: 33 G/DL (ref 31.4–37.4)
MCV RBC AUTO: 89 FL (ref 82–98)
MONOCYTES # BLD AUTO: 0.56 THOUSAND/ΜL (ref 0.17–1.22)
MONOCYTES NFR BLD AUTO: 12 % (ref 4–12)
MV E'TISSUE VEL-SEP: 9 CM/S
MV PEAK A VEL: 0.82 M/S
MV PEAK E VEL: 72 CM/S
MV STENOSIS PRESSURE HALF TIME: 55 MS
MV VALVE AREA P 1/2 METHOD: 4
NEUTROPHILS # BLD AUTO: 1.9 THOUSANDS/ΜL (ref 1.85–7.62)
NEUTS SEG NFR BLD AUTO: 40 % (ref 43–75)
NITRITE UR QL STRIP: NEGATIVE
NON-SQ EPI CELLS URNS QL MICRO: NORMAL /HPF
NRBC BLD AUTO-RTO: 0 /100 WBCS
P AXIS: 59 DEGREES
PH UR STRIP.AUTO: 7 [PH]
PLATELET # BLD AUTO: 313 THOUSANDS/UL (ref 149–390)
PMV BLD AUTO: 9.7 FL (ref 8.9–12.7)
POTASSIUM SERPL-SCNC: 4.3 MMOL/L (ref 3.5–5.3)
PR INTERVAL: 170 MS
PROT SERPL-MCNC: 7.5 G/DL (ref 6.4–8.4)
PROT UR STRIP-MCNC: NEGATIVE MG/DL
PROTOCOL NAME: NORMAL
PSA SERPL-MCNC: 1.38 NG/ML (ref 0–4)
QRS AXIS: 70 DEGREES
QRSD INTERVAL: 94 MS
QT INTERVAL: 414 MS
QTC INTERVAL: 441 MS
RATE PRESSURE PRODUCT: NORMAL
RBC # BLD AUTO: 4.92 MILLION/UL (ref 3.88–5.62)
RBC #/AREA URNS AUTO: NORMAL /HPF
RIGHT ATRIUM AREA SYSTOLE A4C: 18.1 CM2
RIGHT VENTRICLE ID DIMENSION: 3.4 CM
SL CV LEFT ATRIUM LENGTH A2C: 5.4 CM
SL CV LV EF: 65
SL CV LV EF: 65
SL CV PED ECHO LEFT VENTRICLE DIASTOLIC VOLUME (MOD BIPLANE) 2D: 104 ML
SL CV PED ECHO LEFT VENTRICLE SYSTOLIC VOLUME (MOD BIPLANE) 2D: 34 ML
SL CV STRESS RECOVERY BP: NORMAL MMHG
SL CV STRESS RECOVERY HR: 96 BPM
SL CV STRESS RECOVERY O2 SAT: 98 %
SL CV STRESS STAGE REACHED: 5
SODIUM SERPL-SCNC: 139 MMOL/L (ref 135–147)
SP GR UR STRIP.AUTO: 1.01 (ref 1–1.03)
STRESS ANGINA INDEX: 0
STRESS BASELINE BP: NORMAL MMHG
STRESS BASELINE HR: 68 BPM
STRESS O2 SAT REST: 100 %
STRESS PEAK HR: 150 BPM
STRESS POST ESTIMATED WORKLOAD: 15.2 METS
STRESS POST EXERCISE DUR MIN: 9 MIN
STRESS POST EXERCISE DUR MIN: 9 MIN
STRESS POST EXERCISE DUR SEC: 0 SEC
STRESS POST O2 SAT PEAK: 98 %
STRESS POST PEAK BP: 192 MMHG
STRESS POST PEAK HR: 150 BPM
STRESS POST PEAK SYSTOLIC BP: 192 MMHG
T WAVE AXIS: -15 DEGREES
TARGET HR FORMULA: NORMAL
TEST INDICATION: NORMAL
TR MAX PG: 14 MMHG
TR PEAK VELOCITY: 1.8 M/S
TRICUSPID ANNULAR PLANE SYSTOLIC EXCURSION: 2.2 CM
TRICUSPID VALVE PEAK REGURGITATION VELOCITY: 1.84 M/S
TRIGL SERPL-MCNC: 81 MG/DL (ref ?–150)
TSH SERPL DL<=0.05 MIU/L-ACNC: 2.07 UIU/ML (ref 0.45–4.5)
UROBILINOGEN UR STRIP-ACNC: <2 MG/DL
VENTRICULAR RATE: 68 BPM
WBC # BLD AUTO: 4.8 THOUSAND/UL (ref 4.31–10.16)
WBC #/AREA URNS AUTO: NORMAL /HPF

## 2024-12-03 PROCEDURE — 93005 ELECTROCARDIOGRAM TRACING: CPT

## 2024-12-03 PROCEDURE — 83695 ASSAY OF LIPOPROTEIN(A): CPT

## 2024-12-03 PROCEDURE — 36415 COLL VENOUS BLD VENIPUNCTURE: CPT

## 2024-12-03 PROCEDURE — 80061 LIPID PANEL: CPT

## 2024-12-03 PROCEDURE — 84153 ASSAY OF PSA TOTAL: CPT

## 2024-12-03 PROCEDURE — 99499EX: Performed by: FAMILY MEDICINE

## 2024-12-03 PROCEDURE — 93350 STRESS TTE ONLY: CPT

## 2024-12-03 PROCEDURE — 84443 ASSAY THYROID STIM HORMONE: CPT

## 2024-12-03 PROCEDURE — 76700 US EXAM ABDOM COMPLETE: CPT

## 2024-12-03 PROCEDURE — 82306 VITAMIN D 25 HYDROXY: CPT

## 2024-12-03 PROCEDURE — 93922 UPR/L XTREMITY ART 2 LEVELS: CPT

## 2024-12-03 PROCEDURE — 85025 COMPLETE CBC W/AUTO DIFF WBC: CPT

## 2024-12-03 PROCEDURE — VASC: Performed by: SURGERY

## 2024-12-03 PROCEDURE — 93306 TTE W/DOPPLER COMPLETE: CPT

## 2024-12-03 PROCEDURE — 93306 TTE W/DOPPLER COMPLETE: CPT | Performed by: INTERNAL MEDICINE

## 2024-12-03 PROCEDURE — 93350 STRESS TTE ONLY: CPT | Performed by: INTERNAL MEDICINE

## 2024-12-03 PROCEDURE — 83036 HEMOGLOBIN GLYCOSYLATED A1C: CPT

## 2024-12-03 PROCEDURE — 86141 C-REACTIVE PROTEIN HS: CPT

## 2024-12-03 PROCEDURE — 93010 ELECTROCARDIOGRAM REPORT: CPT | Performed by: INTERNAL MEDICINE

## 2024-12-03 PROCEDURE — 81001 URINALYSIS AUTO W/SCOPE: CPT

## 2024-12-03 PROCEDURE — 80053 COMPREHEN METABOLIC PANEL: CPT

## 2024-12-03 NOTE — ASSESSMENT & PLAN NOTE
Your cholesterol today is elevated at 239 (normal is less than 200).  Also, your cardiovascular risk score (ASCVD) is elevated at 9%.  Fortunately, your cardiac testing today was negative (including stress test, EKG, and echocardiogram).    Statin

## 2024-12-03 NOTE — PROGRESS NOTES
[facility] Physical Exam     Cruz Faye is a 49 y.o. male who is presenting for his second [facility] Physical Exam at [facility]. Randy is the  of [employer].  He and his family reside in [place].    Randy's past medical history is significant for hyperlipidemia, impaired fasting glucose, vitamin D deficiency, BPH, and vitamin D deficiency.    His past surgical history is significant for vasectomy in 2013.    Randy does not have any significant family history.    He does not smoke cigarettes, but does smoke a few cigars per month.  He states he does not inhale.  He drinks approximately 1 alcoholic beverage per day on average.  He consumes 2 cups of coffee per day.  He is  with 2 children. He states that his diet is relatively healthy. He exercises regularly; lifting weights 4 times per week, along with riding a bike twice weekly.       Review of Systems   Constitutional:  Negative for chills and fever.   HENT:  Negative for ear pain and sore throat.    Eyes:  Negative for pain and visual disturbance.   Respiratory:  Negative for cough and shortness of breath.    Cardiovascular:  Negative for chest pain and palpitations.   Gastrointestinal:  Negative for abdominal pain and vomiting.   Genitourinary:  Negative for dysuria and hematuria.   Musculoskeletal:  Negative for arthralgias and back pain.   Skin:  Negative for color change and rash.   Neurological:  Negative for seizures and syncope.   All other systems reviewed and are negative.        Active Ambulatory Problems     Diagnosis Date Noted    Seasonal allergic rhinitis due to pollen 08/23/2022    Class 2 obesity due to excess calories without serious comorbidity with body mass index (BMI) of 36.0 to 36.9 in adult 08/23/2022    Irritable bowel syndrome with diarrhea 08/23/2022    Benign prostatic hyperplasia with urinary frequency 08/23/2022    Vitamin D deficiency  "08/23/2022    Mixed hyperlipidemia 08/23/2022    Abnormal blood sugar 08/23/2022    Microscopic hematuria 08/23/2022    Lung nodule 08/23/2022    Elevated ALT measurement 12/03/2024    Iliac aneurysm (HCC) 12/03/2024     Resolved Ambulatory Problems     Diagnosis Date Noted    Well adult exam 08/23/2022    NAFLD (nonalcoholic fatty liver disease) 12/03/2024     Past Medical History:   Diagnosis Date    Allergic     BPH (benign prostatic hyperplasia)     Hyperlipidemia     Obesity        Past Surgical History:   Procedure Laterality Date    VASECTOMY  2013       Family History   Problem Relation Age of Onset    No Known Problems Mother     No Known Problems Father        Social History     Tobacco Use   Smoking Status Some Days    Types: Cigars   Smokeless Tobacco Never       No current outpatient medications on file.    No Known Allergies      Objective:    Vitals:    12/03/24 0801   BP: 132/76   Pulse: 69   Resp: 14   SpO2: 100%   Weight: 116 kg (256 lb)   Height: 5' 10.25\" (1.784 m)        Physical Exam  Constitutional:       Appearance: Normal appearance.   HENT:      Head: Normocephalic and atraumatic.      Right Ear: Tympanic membrane, ear canal and external ear normal. There is no impacted cerumen.      Left Ear: Tympanic membrane, ear canal and external ear normal. There is no impacted cerumen.      Nose: Nose normal.      Mouth/Throat:      Mouth: Mucous membranes are moist.      Pharynx: Oropharynx is clear. No posterior oropharyngeal erythema.   Eyes:      Extraocular Movements: Extraocular movements intact.      Conjunctiva/sclera: Conjunctivae normal.      Pupils: Pupils are equal, round, and reactive to light.   Neck:      Vascular: No carotid bruit.   Cardiovascular:      Rate and Rhythm: Normal rate and regular rhythm.      Pulses: Normal pulses.      Heart sounds: Normal heart sounds. No murmur heard.  Pulmonary:      Effort: Pulmonary effort is normal.      Breath sounds: Normal breath sounds. "   Abdominal:      General: Abdomen is flat. Bowel sounds are normal. There is no distension.      Palpations: Abdomen is soft. There is no mass.      Tenderness: There is no abdominal tenderness.      Hernia: No hernia is present.   Genitourinary:     Comments: JEYSON: declined/deferred  Musculoskeletal:         General: Normal range of motion.      Cervical back: Normal range of motion and neck supple.   Lymphadenopathy:      Cervical: No cervical adenopathy.   Skin:     General: Skin is warm.      Capillary Refill: Capillary refill takes less than 2 seconds.      Coloration: Skin is not jaundiced.      Findings: No rash.   Neurological:      General: No focal deficit present.      Mental Status: He is alert and oriented to person, place, and time.      Cranial Nerves: No cranial nerve deficit.      Motor: No weakness.      Gait: Gait normal.   Psychiatric:         Mood and Affect: Mood normal.         Behavior: Behavior normal.         Thought Content: Thought content normal.         Judgment: Judgment normal.             Assessment/Plan:     1. Class 2 obesity due to excess calories without serious comorbidity with body mass index (BMI) of 36.0 to 36.9 in adult  Assessment & Plan:  Your current weight is 256 lbs (BMI of 36.47).  Taking your body type into consideration, I think a weight loss of 35 lb would provide considerable results in our overall health status.  Please follow the recommendations given to you today by our dietitian and exercise physiologist.     2. Mixed hyperlipidemia  Assessment & Plan:  Your cholesterol today is elevated at 239 (normal is less than 200).  Also, your cardiovascular risk score (ASCVD) is elevated at 9%.  Fortunately, your cardiac testing today was negative (including stress test, EKG, and echocardiogram).    Statin  3. Abnormal blood sugar  4. Lung nodule  Assessment & Plan:  Your CT scan from August 2022 revealed a small 3 mm nodule in the right upper lobe of your lung.  This is  most likely a benign finding in a low risk individual. But of an abundance of caution, I would recommend repeating CT of the chest in the near future to ensure stability.  5. Vitamin D deficiency  6. Elevated ALT measurement  Assessment & Plan:  One of your liver function enzymes (ALT) is mildly elevated today.  This can sometimes be linked to obesity and lifestyle decisions.  I would recommend continuing to work on diet exercise and weight loss.  You should have this number rechecked again in approximately 6 months.  7. Iliac aneurysm (HCC)  Assessment & Plan:  Your common iliac arteries are mildly dilated on today's exam.  This may be a normal variant in your case due to your large body habitus and frequent bike riding.  You probably do not need to have this followed       Executive Physical Summary:     Overall, I think you are in pretty good health today.  Your skin exam today did not reveal any significant abnormalities.  I would recommend continued annual checkups with a dermatologist.  Your cardiology testing was negative (including EKG, stress test, and echocardiogram).     (Also please refer to individually listed diagnoses, cardiology, dermatology audiology, fitness, and nutrition reports for more information).    Since you are 49 years old, I would recommend that your start colon cancer screening (either colonoscopy or Cologuard stool testing). I would recommend discussing this with your PCP.    I reviewed your vaccination history. I would recommend getting this years flu shot and Covid booster. Your last tetanus booster was in 2019 (your next is due by 2029).       Lastly, I would recommend continuing a healthy diet (including vitamin D 2000 international units daily and calcium 1200 mg daily).  I would also recommend continuing your regular exercise program (at least 150 minutes of aerobic exercise weekly).         Randy,    Thank you for choosing [facility].  It was a pleasure seeing you  again today.  If you have any questions regarding today's exam, feel free to contact me. We hope to see you again in the future.     Jose Maria Barrientos (Formerly [facility], later [facility] Lead Physician)  [phone]  [email or web address]

## 2024-12-03 NOTE — ASSESSMENT & PLAN NOTE
Your common iliac arteries are mildly dilated on today's exam.  This may be a normal variant in your case due to your large body habitus and frequent bike riding.  You probably do not need to have this followed

## 2024-12-03 NOTE — PROGRESS NOTES
"Nutritional Summary and Recommendations:    Patient Nutrition-Oriented Medical/Diet Hx  Cruz presents today to [facility] for nutrition assessment and counseling. Cruz has been working with a RD before today, meeting about once per month.  They are working on getting Cruz into more of a routine with meals, planning them out with a goal of making healthier options, portion control, less impulsivity when choosing foods.      He mentions that right now his wife mainly plans the meals for the family (also a pre-teen & teen at home) but at the end of this month he is taking a few months off of work and then that will be his responsibility.  Cruz is comfortable with cooking but the \"what do I make?\" Is a barrier.  Reviewed some tips such as different recipe sites, maybe utilizing home delivery services, an dhaval to keep recipes together (see below for suggestions discussed).    Cruz notes he does well with executing the changes when he has a plan and a routine so has put reminders in his phone calendar.  He does note struggling a little with snacking more later on at night due to idle time, being bored, during relaxation time. Suggested planning out even his snacks so that his mind has less room to wander and go for other things.  Discussed that if he works \"fun\" snacks into his routine a few nights a week it may even lead to eating them less often and smaller portions as well as making him more mindful to work in healthy snacks.  Cruz mainly drinks water or other calorie-free/low calorie drinks but could benefit from increased hydration. Reviewed estimated fluid needs and tips to increase.   Discussed that it is ok and possibly beneficial to try and to meet fluid/night snack & meal planning goals 3-4 days/week vs trying to do that every day to help make them lifestyle changes. Pt was a pleasure to meet and speak with Cruz today.       Suggested meal delivery services: " "Every Plate & Dinnerly.  Keep recipe cards & as a bonus-if you have the foods in thr fridge to make the meals it may be less tempting to go out/order out.     Recipe site for vegetables: Home ? [employer] (also on [employer])      Look into \"sheet pan meals\" online/social media sites.      Kathie to store recipes/remove blog filler: Recipe Keeper     Nutrition Related Medications/Supplements:    Multivitamin     Labs:  A1C 6.1  Total Cholesterol 239  Triglycerides 81  HDL 56    Vitamin D (awaiting result)      Anthropometrics:     Ht: 5'10\"  Wt: 257 lb   BMI: 36.9     BMR: 2247 kcal  Fat%: 37.7 %  Acceptable Range: 11-22 %     Fat Mass: 96.8 lb FFM: 160.2 lb TBW:117.2 lb     Estimated Energy Requirements:    2900 mL/~100 oz fluids (5 mL/kg)      Nutrition Recommendations:    3-4 days/week: aim to meet fluid goal, plan out lunch or dinner, plan out evening snacks  Reach out to RD with any questions or concerns.    Nutrition Education:    Adequate fluids, healthy snacking, working consistency with meal prepping/planning into schedule.            Perceived Comprehension:  Good     Expected Compliance: Good  "

## 2024-12-03 NOTE — ASSESSMENT & PLAN NOTE
"Your imaging studies today revealed fatty infiltration of your liver.  Also, one of your liver function enzymes (ALT) is mildly elevated today. \"  Fatty liver\" is a condition that can cause considerable problems down the line if not treated.  These problems include fibrosis, cirrhosis, and even hepatic carcinoma.  I would recommend continuing to work on diet exercise and weight loss.  "

## 2024-12-03 NOTE — ASSESSMENT & PLAN NOTE
One of your liver function enzymes (ALT) is mildly elevated today.  This can sometimes be linked to obesity and lifestyle decisions.  I would recommend continuing to work on diet exercise and weight loss.  You should have this number rechecked again in approximately 6 months.

## 2024-12-03 NOTE — PROGRESS NOTES
Hearing Assessment Summary:   Hearing Screening    250Hz 500Hz 1000Hz 2000Hz 3000Hz 4000Hz 6000Hz 8000Hz   Right ear 20 20 15 20 20 15 10 15   Left ear 20 20 15 10 20 15 5 5   Comments: HEARING EVALUATION    Name:  Cruz Faye  :  1975  Age:  49 y.o.   MRN:  [id number]  Date of Evaluation: 24     History: [facility] Exam  Reason for visit: Cruz Faye is being seen today for an evaluation of hearing as part of an [facility] examination.  Patient reports no concern over hearing. He reports that he recently recovered from walking pneumonia. He denies ear pain, tinnitus and dizziness.       EVALUATION:    Otoscopic Evaluation:   Right Ear: Clear and healthy ear canal and tympanic membrane   Left Ear: Clear and healthy ear canal and tympanic membrane    Tympanometry:   Right: Type C - negative pressure   Left: Type C - negative pressure    Audiogram Results:  Normal peripheral hearing sensitivity in each ear. Excellent speech discrimination ability, bilaterally.    *see attached audiogram      RECOMMENDATIONS:  Return to [facility] for F/U, Copy to Patient/Caregiver, and hearing evaluation in 2 years (sooner if difficulty noted).    PATIENT EDUCATION:   Discussed results and recommendations with patient.  Questions were addressed and the patient was encouraged to contact our department should concerns arise.      Keri Ty.  Clinical Audiologist'      Vision Screening    Right eye Left eye Both eyes   Without correction 20/15 20/15 20/13   With correction

## 2024-12-03 NOTE — PROGRESS NOTES
"  Your [facility] Board-Certified Dermatologist's Name: Yoli Lanza MD    Skin Screening Exam:    A chaperone was present throughout the entire encounter.      SKIN:  FULL ORGAN SYSTEM EXAM   Hair, Scalp, Ears, Face Normal except as noted below in Assessment   Neck, Cervical Chain Nodes Normal except as noted below in Assessment   Right Arm/Hand/Fingers Normal except as noted below in Assessment   Left Arm/Hand/Fingers Normal except as noted below in Assessment   Chest/Breasts/Axillae Did the patient specifically refuse to have the areas \"under-the-bra\" examined by the Dermatologist? No  Examined areas normal except as noted below in Assessment   Abdomen, Umbilicus Normal except as noted below in Assessment   Back/Spine Normal except as noted below in Assessment   Groin/Genitalia/Buttocks Did the patient specifically refuse to have the areas \"under-the-underwear\" examined by the Dermatologist? No  Examined areas normal except as noted below in Assessment   Right Leg, Foot, Toes Normal except as noted below in Assessment   Left Leg, Foot, Toes Normal except as noted below in Assessment        Assessment and Plan by Diagnosis:    Use a moisturizer + sunscreen \"combo\" product such as Neutrogena Daily Defense SPF 50+ or CeraVe AM at least three times a day.  Follow-up with your private dermatologist or one of our board-certified [facility] Dermatologists as discussed.  [facility]'s own Dr. Jyoti Singh is available for consultation for skin enhancement and revitalization services; please mention \"[facility]\" for expedited scheduling      "

## 2024-12-03 NOTE — PROGRESS NOTES
HEART AND VASCULAR SUMMARY    1. Lipids: Total 239, TG 81, HDL 56,  (higher in 2022, with  then)    2. ECG: Normal    3. Echocardiogram: Normal    4. Stress ECHO: Normal    5. Vascular testing: Carotids Normal, Abdominal Aorta Normal    6. Coronary Calcium CT: 0 in 2022, only repeat every 5 years    7. The 10-year ASCVD risk score (Maryjane RUANO, et al., 2019) is: 7.6%    Values used to calculate the score:      Age: 49 years      Sex: Male      Is Non- : No      Diabetic: No      Tobacco smoker: Yes      Systolic Blood Pressure: 132 mmHg      Is BP treated: No      HDL Cholesterol: 56 mg/dL      Total Cholesterol: 239 mg/dL     8. HSCRP:   Lab Results   Component Value Date    HSCRP 0.92 12/03/2024       9. Vitals:   Vitals:    12/03/24 0801   BP: 132/76   Pulse: 69   Resp: 14   SpO2: 100%       Impression and Recommendations:    BP is borderline high (best is <130/80)  Cholesterol is severely elevated but slightly better than in 2022.  Cardiac risk is close to intermediate at 7.6%.  More aggressive lifestyle modification, weight loss is recommended.

## 2024-12-03 NOTE — ASSESSMENT & PLAN NOTE
Your current weight is 256 lbs (BMI of 36.47).  Taking your body type into consideration, I think a weight loss of 35 lb would provide considerable results in our overall health status.  Please follow the recommendations given to you today by our dietitian and exercise physiologist.

## 2024-12-03 NOTE — ASSESSMENT & PLAN NOTE
Your CT scan from August 2022 revealed a small 3 mm nodule in the right upper lobe of your lung.  This is most likely a benign finding in a low risk individual. But of an abundance of caution, I would recommend repeating CT of the chest in the near future to ensure stability.

## 2024-12-03 NOTE — PROGRESS NOTES
Fitness Summary and Recommendations: Cruz scored at the 5% on his body composition assessment with a bodyfat % of 37.7%. Cruz scored in the above average range for flexibility with a sit & reach score of 28 cm. His Muscle Strength/Endurance scores placed him at the 85% (lower body) and 95% (upper body) with a chair stand score of 31 and arm curl score of 39 respectively. Cruz's Cardiovascular Score of 60.3 placed him at the 95%. Overall, Cruz would be considered to have an above average fitness level with a 70% score. His overall fitness score has increased by 11% from his previous test on 08/23/22. Continued emphasis on regular exercise and sound nutrition practices will enable Cruz to reach his optimal level of fitness.

## 2024-12-04 LAB — LPA SERPL-SCNC: 42.9 NMOL/L

## 2024-12-05 ENCOUNTER — RESULTS FOLLOW-UP (OUTPATIENT)
Dept: FAMILY MEDICINE CLINIC | Facility: CLINIC | Age: 49
End: 2024-12-05

## 2024-12-05 LAB — 25(OH)D3 SERPL-MCNC: 29.3 NG/ML (ref 30–100)
